# Patient Record
Sex: MALE | Race: WHITE
[De-identification: names, ages, dates, MRNs, and addresses within clinical notes are randomized per-mention and may not be internally consistent; named-entity substitution may affect disease eponyms.]

---

## 2022-07-26 RX ORDER — VALSARTAN AND HYDROCHLOROTHIAZIDE 320; 25 MG/1; MG/1
TABLET, FILM COATED ORAL
Qty: 30 TABLET | Refills: 0 | Status: SHIPPED | OUTPATIENT
Start: 2022-07-26 | End: 2022-08-26 | Stop reason: SDUPTHER

## 2022-07-27 ENCOUNTER — TELEPHONE (OUTPATIENT)
Dept: FAMILY MEDICINE CLINIC | Facility: CLINIC | Age: 32
End: 2022-07-27

## 2022-07-27 NOTE — TELEPHONE ENCOUNTER
Patients phone # is incorrect.  NATALIE with Leonie for him to call & make an appt & give us his new #

## 2022-08-26 ENCOUNTER — TELEMEDICINE (OUTPATIENT)
Dept: FAMILY MEDICINE CLINIC | Facility: CLINIC | Age: 32
End: 2022-08-26
Payer: COMMERCIAL

## 2022-08-26 DIAGNOSIS — I10 ESSENTIAL (PRIMARY) HYPERTENSION: Primary | ICD-10-CM

## 2022-08-26 DIAGNOSIS — F41.9 ANXIETY: ICD-10-CM

## 2022-08-26 PROCEDURE — 99214 OFFICE O/P EST MOD 30 MIN: CPT | Performed by: PHYSICIAN ASSISTANT

## 2022-08-26 RX ORDER — ESCITALOPRAM OXALATE 10 MG/1
10 TABLET ORAL EVERY EVENING
Qty: 30 TABLET | Refills: 3 | Status: SHIPPED | OUTPATIENT
Start: 2022-08-26

## 2022-08-26 RX ORDER — VALSARTAN AND HYDROCHLOROTHIAZIDE 320; 25 MG/1; MG/1
TABLET, FILM COATED ORAL
Qty: 90 TABLET | Refills: 0 | Status: SHIPPED | OUTPATIENT
Start: 2022-08-26 | End: 2022-09-29 | Stop reason: SDUPTHER

## 2022-08-26 ASSESSMENT — PATIENT HEALTH QUESTIONNAIRE - PHQ9
SUM OF ALL RESPONSES TO PHQ QUESTIONS 1-9: 2
SUM OF ALL RESPONSES TO PHQ QUESTIONS 1-9: 2
SUM OF ALL RESPONSES TO PHQ9 QUESTIONS 1 & 2: 2
2. FEELING DOWN, DEPRESSED OR HOPELESS: 1
SUM OF ALL RESPONSES TO PHQ QUESTIONS 1-9: 2
1. LITTLE INTEREST OR PLEASURE IN DOING THINGS: 1
SUM OF ALL RESPONSES TO PHQ QUESTIONS 1-9: 2

## 2022-08-26 NOTE — PROGRESS NOTES
Patient: Claudio James  YOB: 1990  Patient Age 28 y.o. Patient sex: male  Medical Record:  [id number]  Visit Date: 8/26/2022  Author:  Artemio Gillette. Lorraine Wright    [facility] Virtual  Visit Note Video Conference Note  Location: To Patients electronic /phone device in their home  From Medical provider     Claudio James is a 28 y.o. y.o. male  being evaluated by a Virtual Visit (video visit) encounter to address concerns. A caregiver was present when appropriate. Due to this being a TeleHealth encounter (During OOMXI-53 public health emergency), evaluation of the following organ systems was limited: Vitals/Constitutional/EENT/Resp/CV/GI//MS/Neuro/Skin/Heme-Lymph-Imm. Pursuant to the emergency declaration under the [address] [place] [address], [address] [place] [address] authority and the [employer] Act, this Virtual Visit was conducted with patient's (and/or legal guardian's) consent, to reduce the risk of exposure to COVID-19 and provide necessary medical care. Consent:  The patient and/or health care decision maker is aware that that they may receive a bill for this audio-video service, depending on his insurance coverage, and has provided verbal consent to proceed: Yes    Chief Complaint  Claudio James  is a 28 y.o. male who was seen by synchronous (real-time) audio-video technology on 08/26/22  3:18 PM  for the following reasons:    Chief Complaint   Patient presents with    Follow-up     17 month, possible Pysch referral    Medication Refill        Current medical issues addressed today:    Morales Coker is a 78-year-old male who presents today for follow-up he has an ongoing history of hypertension  And is currently treated with losartan with hydrochlorothiazide 320/25 mg he reports his home blood pressure readings are controlled - was normal at recent urgent care visit for his dental infection in last week.   he denies any chest pain or shortness of breath. He has not had labs since a CBC BMP and D-dimer were done in the emergency room on 9/16/2020. He previously saw [facility]. 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 He is struggling feeling down and socially isolating and feeling the stress of having to move in 3 weeks . No suicidal thoughts. ASSESSMENT & PLAN    ICD-10-CM    1. Essential (primary) hypertension  I10       2. Anxiety  F41.9            Diagnoses and all orders for this visit:  Essential (primary) hypertension  Anxiety  Other orders  -     valsartan-hydroCHLOROthiazide (DIOVAN-HCT) 320-25 MG per tablet; TAKE ONE TABLET BY MOUTH DAILY  -     escitalopram (LEXAPRO) 10 MG tablet; Take 1 tablet by mouth every evening   Mathew was seen today for follow-up and medication refill. Diagnoses and all orders for this visit:    Essential (primary) hypertension    Anxiety    Other orders  -     valsartan-hydroCHLOROthiazide (DIOVAN-HCT) 320-25 MG per tablet; TAKE ONE TABLET BY MOUTH DAILY  -     escitalopram (LEXAPRO) 10 MG tablet; Take 1 tablet by mouth every evening    No follow-up provider specified. No orders of the defined types were placed in this encounter. Plan  Continue bp meds and return in 3 weeks for bp recheck  Started on lexapro and can eval in 2 weeks   No orders of the defined types were placed in this encounter. I have reviewed the patient's past medical history, social history and family history and vitals. We have discussed treatment plan and follow up and given patient instructions. Patient's questions are answered and we will follow up as indicated.          Past Medical history  Allergies:No Known Allergies    Current Medications:   Current Outpatient Medications   Medication Sig Dispense Refill    valsartan-hydroCHLOROthiazide (DIOVAN-HCT) 320-25 MG per tablet TAKE ONE TABLET BY MOUTH DAILY 90 tablet 0    escitalopram (LEXAPRO) 10 MG tablet Take 1 tablet by mouth every evening 30 tablet 3     No current facility-administered medications for this visit. Current Problem List:   Patient Active Problem List   Diagnosis    Essential (primary) hypertension       Social History:   Social History     Tobacco Use    Smoking status: Every Day     Packs/day: 0.50     Types: Cigarettes    Smokeless tobacco: Never   Substance Use Topics    Alcohol use: Yes       Family History:   Family History   Problem Relation Age of Onset    No Known Problems Sister     No Known Problems Sister     Diabetes Maternal Grandmother     No Known Problems Maternal Grandfather     No Known Problems Paternal Grandmother     No Known Problems Paternal Grandfather     Hypertension Father     No Known Problems Mother     No Known Problems Brother        Surgical History:History reviewed. No pertinent surgical history. ROS  Review of Systems   Constitutional: Negative for fever. Respiratory: Negative for shortness of breath. Cardiovascular: Negative for chest pain. Neurological: Negative for syncope. Vitals:  those vailabe due to teleconference  are noted below provided by the patient's device in their home  BP Observations: No, remote/electronic monitoring device was not used or able to be verified  Patient-Reported Weight: 210lb       There is no height or weight on file to calculate BMI. Physical Exam    Vitals reviewed. Constitutional:       Appearance: Normal appearance. HENT:      Head: Atraumatic. Eyes:      Conjunctiva/sclera: Conjunctivae normal.   Pulmonary:      Effort: Pulmonary effort is normal.   Musculoskeletal:      Cervical back: Neck supple. Skin:     Coloration: Skin is not jaundiced or pale. Neurological:      General: No focal deficit present. Mental Status: He is alert.    Psychiatric:         Mood and Affect: Mood normal.         We discussed the expected course, resolution and complications of the diagnosis(es) in detail. Medication risks, benefits, costs, interactions, and alternatives were discussed as indicated. I advised him to contact the office if his condition worsens, changes or fails to improve as anticipated. He expressed understanding with the diagnosis(es) and plan. Pursuant to the emergency declaration under the [address] [place] [address] [facility] authority and the [employer] and [employer] Act, this Virtual  Visit was conducted, with patient's consent, to reduce the patient's risk of exposure to COVID-19 and provide continuity of care for an established patient. Services were provided through a video synchronous discussion -- on [employer] virtually to substitute for in-person clinic visit  No follow-ups on file. Patient advised to call the office if symptoms worsen. Calista Ac PA-C  3:18 PM  8/26/2022

## 2022-09-29 RX ORDER — VALSARTAN AND HYDROCHLOROTHIAZIDE 320; 25 MG/1; MG/1
TABLET, FILM COATED ORAL
Qty: 90 TABLET | Refills: 3 | OUTPATIENT
Start: 2022-09-29

## 2022-09-29 RX ORDER — VALSARTAN AND HYDROCHLOROTHIAZIDE 320; 25 MG/1; MG/1
TABLET, FILM COATED ORAL
Qty: 90 TABLET | Refills: 0 | Status: SHIPPED | OUTPATIENT
Start: 2022-09-29

## 2022-09-29 NOTE — TELEPHONE ENCOUNTER
----- Message from Lizbeth Hendricks sent at 9/29/2022  1:36 PM EDT -----  Subject: Refill Request    QUESTIONS  Name of Medication? valsartan-hydroCHLOROthiazide (DIOVAN-HCT) 320-25 MG   per tablet  Patient-reported dosage and instructions? 320-25 mg  How many days do you have left? 0  [facility]  phone number (if available)? [phone]  ---------------------------------------------------------------------------  --------------  Raquel Milton INFO  What is the best way for the office to contact you? OK to leave message on   voicemail  Preferred Call Back Phone Number? [phone]  ---------------------------------------------------------------------------  --------------  SCRIPT ANSWERS  Relationship to Patient?  Self